# Patient Record
Sex: FEMALE | URBAN - METROPOLITAN AREA
[De-identification: names, ages, dates, MRNs, and addresses within clinical notes are randomized per-mention and may not be internally consistent; named-entity substitution may affect disease eponyms.]

---

## 2019-02-20 ENCOUNTER — APPOINTMENT (OUTPATIENT)
Age: 49
Setting detail: DERMATOLOGY
End: 2019-02-21

## 2019-02-20 PROBLEM — L81.4 OTHER MELANIN HYPERPIGMENTATION: Status: ACTIVE | Noted: 2019-02-20

## 2019-02-20 PROCEDURE — OTHER REASSURANCE: OTHER

## 2019-02-20 PROCEDURE — 99202 OFFICE O/P NEW SF 15 MIN: CPT

## 2019-02-20 PROCEDURE — OTHER ADDITIONAL NOTES: OTHER

## 2019-02-20 NOTE — HPI: DISCOLORATION
How Severe Is Your Skin Discoloration?: mild
Additional History: ptis having a Exderm Peel, March 12,13. states she needs to be on Accutane for 2 mo after the peel

## 2019-02-20 NOTE — PROCEDURE: ADDITIONAL NOTES
Detail Level: Simple
Additional Notes: Patient requesting Accutane today for PIH in advance of a phenol peel scheduled for March in [place] by a plastic surgeon. Patient has history of Radio-frequency treatment to face 2 years ago after which she claims she has been scarred and developed wrinkles on face. Currently using topical Retin-A. \\n\\nOn exam I could not appreciate any scarring, PIH or deep facial rhytids. Overall skin appearance is smooth and flawless with some visible skin dryness and scaling which is suspected to as a result of retinoids use. I strongly advised patient against a phenol peel due to lack of severity of any skin issues. Also informed her we cannot prescribe Accutane for PIH that too in advance of a procedure. \\n\\nPatient seems reluctant to understand that she does not need Accutane or the phenol peel for cosmetic  improvement at present. I advised her to follow up with plastic surgeon performing the peel for further care.

## 2019-02-20 NOTE — PROCEDURE: REASSURANCE
Detail Level: Zone
Hide Additional Notes?: No
Additional Notes (Optional): Patient reassured that no hyperpigmentation disorder observed at present.